# Patient Record
Sex: MALE | Race: OTHER | NOT HISPANIC OR LATINO
[De-identification: names, ages, dates, MRNs, and addresses within clinical notes are randomized per-mention and may not be internally consistent; named-entity substitution may affect disease eponyms.]

---

## 2022-10-26 PROBLEM — Z00.00 ENCOUNTER FOR PREVENTIVE HEALTH EXAMINATION: Status: ACTIVE | Noted: 2022-10-26

## 2022-11-07 ENCOUNTER — APPOINTMENT (OUTPATIENT)
Dept: CV DIAGNOSITCS | Facility: HOSPITAL | Age: 57
End: 2022-11-07

## 2022-11-07 ENCOUNTER — OUTPATIENT (OUTPATIENT)
Dept: OUTPATIENT SERVICES | Facility: HOSPITAL | Age: 57
LOS: 1 days | End: 2022-11-07
Payer: COMMERCIAL

## 2022-11-07 VITALS
DIASTOLIC BLOOD PRESSURE: 98 MMHG | WEIGHT: 151.9 LBS | SYSTOLIC BLOOD PRESSURE: 125 MMHG | RESPIRATION RATE: 18 BRPM | TEMPERATURE: 98 F | HEIGHT: 67 IN | HEART RATE: 91 BPM | OXYGEN SATURATION: 98 %

## 2022-11-07 VITALS
DIASTOLIC BLOOD PRESSURE: 90 MMHG | HEART RATE: 66 BPM | OXYGEN SATURATION: 100 % | RESPIRATION RATE: 16 BRPM | SYSTOLIC BLOOD PRESSURE: 132 MMHG

## 2022-11-07 DIAGNOSIS — I48.91 UNSPECIFIED ATRIAL FIBRILLATION: ICD-10-CM

## 2022-11-07 LAB
ANION GAP SERPL CALC-SCNC: 12 MMOL/L — SIGNIFICANT CHANGE UP (ref 5–17)
BUN SERPL-MCNC: 17 MG/DL — SIGNIFICANT CHANGE UP (ref 7–23)
CALCIUM SERPL-MCNC: 9.2 MG/DL — SIGNIFICANT CHANGE UP (ref 8.4–10.5)
CHLORIDE SERPL-SCNC: 103 MMOL/L — SIGNIFICANT CHANGE UP (ref 96–108)
CO2 SERPL-SCNC: 25 MMOL/L — SIGNIFICANT CHANGE UP (ref 22–31)
CREAT SERPL-MCNC: 0.84 MG/DL — SIGNIFICANT CHANGE UP (ref 0.5–1.3)
EGFR: 103 ML/MIN/1.73M2 — SIGNIFICANT CHANGE UP
GLUCOSE SERPL-MCNC: 111 MG/DL — HIGH (ref 70–99)
HCT VFR BLD CALC: 44.8 % — SIGNIFICANT CHANGE UP (ref 39–50)
HGB BLD-MCNC: 15.2 G/DL — SIGNIFICANT CHANGE UP (ref 13–17)
INR BLD: 1.26 RATIO — HIGH (ref 0.88–1.16)
MCHC RBC-ENTMCNC: 30.6 PG — SIGNIFICANT CHANGE UP (ref 27–34)
MCHC RBC-ENTMCNC: 33.9 GM/DL — SIGNIFICANT CHANGE UP (ref 32–36)
MCV RBC AUTO: 90.3 FL — SIGNIFICANT CHANGE UP (ref 80–100)
NRBC # BLD: 0 /100 WBCS — SIGNIFICANT CHANGE UP (ref 0–0)
PLATELET # BLD AUTO: 225 K/UL — SIGNIFICANT CHANGE UP (ref 150–400)
POTASSIUM SERPL-MCNC: 4.5 MMOL/L — SIGNIFICANT CHANGE UP (ref 3.5–5.3)
POTASSIUM SERPL-SCNC: 4.5 MMOL/L — SIGNIFICANT CHANGE UP (ref 3.5–5.3)
PROTHROM AB SERPL-ACNC: 14.6 SEC — HIGH (ref 10.5–13.4)
RBC # BLD: 4.96 M/UL — SIGNIFICANT CHANGE UP (ref 4.2–5.8)
RBC # FLD: 13.1 % — SIGNIFICANT CHANGE UP (ref 10.3–14.5)
SODIUM SERPL-SCNC: 140 MMOL/L — SIGNIFICANT CHANGE UP (ref 135–145)
WBC # BLD: 6.35 K/UL — SIGNIFICANT CHANGE UP (ref 3.8–10.5)
WBC # FLD AUTO: 6.35 K/UL — SIGNIFICANT CHANGE UP (ref 3.8–10.5)

## 2022-11-07 PROCEDURE — 93005 ELECTROCARDIOGRAM TRACING: CPT

## 2022-11-07 PROCEDURE — 85610 PROTHROMBIN TIME: CPT

## 2022-11-07 PROCEDURE — 93312 ECHO TRANSESOPHAGEAL: CPT | Mod: 26

## 2022-11-07 PROCEDURE — 76376 3D RENDER W/INTRP POSTPROCES: CPT | Mod: 26

## 2022-11-07 PROCEDURE — 76376 3D RENDER W/INTRP POSTPROCES: CPT

## 2022-11-07 PROCEDURE — 93312 ECHO TRANSESOPHAGEAL: CPT

## 2022-11-07 PROCEDURE — 93010 ELECTROCARDIOGRAM REPORT: CPT

## 2022-11-07 PROCEDURE — 85027 COMPLETE CBC AUTOMATED: CPT

## 2022-11-07 PROCEDURE — 93306 TTE W/DOPPLER COMPLETE: CPT

## 2022-11-07 PROCEDURE — 80048 BASIC METABOLIC PNL TOTAL CA: CPT

## 2022-11-07 PROCEDURE — 92960 CARDIOVERSION ELECTRIC EXT: CPT

## 2022-11-07 PROCEDURE — 93306 TTE W/DOPPLER COMPLETE: CPT | Mod: 26

## 2022-11-07 NOTE — ASU PATIENT PROFILE, ADULT - FALL HARM RISK - UNIVERSAL INTERVENTIONS
Bed in lowest position, wheels locked, appropriate side rails in place/Call bell, personal items and telephone in reach/Instruct patient to call for assistance before getting out of bed or chair/Non-slip footwear when patient is out of bed/[place] to call system/Physically safe environment - no spills, clutter or unnecessary equipment/Purposeful Proactive Rounding/Room/bathroom lighting operational, light cord in reach

## 2022-11-07 NOTE — PROGRESS NOTE ADULT - SUBJECTIVE AND OBJECTIVE BOX
EP Brief Operative Note    Diagnosis: persistent AF  Procedure: JO-DCCV  Surgeon: Hakeem Evans M.D.  Findings: none  EBL: minimal  Specimens: none  Post-op Diagnosis: persistent AF  Assistants: none      A/P) successful JO-DCCV, no acute complications    -d/c home  -no changes to home meds  -continue anticoagulation  -f/u with me at Dr Sosa's office on Tuesday 11/29 at 140pm      Hakeem Evans M.D., [facility]  Cardiac Electrophysiology  [facility]  [address] [place] [address]  [email or web address]    office [phone]  pager [phone]

## 2022-11-07 NOTE — PRE-ANESTHESIA EVALUATION ADULT - NSANTHBMIRD_ENT_A_CORE
Pt called in. Pt stated that he sent down a refill request for hydrocodone early last week, but still has not received anything. Per [employer], no refill request was received. Order placed 1/23/2020.    No

## 2023-01-03 ENCOUNTER — EMERGENCY (EMERGENCY)
Facility: HOSPITAL | Age: 58
LOS: 1 days | Discharge: HOME | End: 2023-01-03
Attending: EMERGENCY MEDICINE | Admitting: EMERGENCY MEDICINE
Payer: COMMERCIAL

## 2023-01-03 VITALS
OXYGEN SATURATION: 100 % | HEART RATE: 82 BPM | SYSTOLIC BLOOD PRESSURE: 133 MMHG | DIASTOLIC BLOOD PRESSURE: 96 MMHG | RESPIRATION RATE: 19 BRPM

## 2023-01-03 VITALS
DIASTOLIC BLOOD PRESSURE: 75 MMHG | OXYGEN SATURATION: 98 % | SYSTOLIC BLOOD PRESSURE: 143 MMHG | TEMPERATURE: 98 F | RESPIRATION RATE: 18 BRPM | HEART RATE: 105 BPM

## 2023-01-03 LAB
ALBUMIN SERPL ELPH-MCNC: 4.7 G/DL — SIGNIFICANT CHANGE UP (ref 3.3–5)
ALP SERPL-CCNC: 80 U/L — SIGNIFICANT CHANGE UP (ref 40–120)
ALT FLD-CCNC: 34 U/L — SIGNIFICANT CHANGE UP (ref 4–41)
ANION GAP SERPL CALC-SCNC: 13 MMOL/L — SIGNIFICANT CHANGE UP (ref 7–14)
APTT BLD: 35.8 SEC — SIGNIFICANT CHANGE UP (ref 27–36.3)
AST SERPL-CCNC: 49 U/L — HIGH (ref 4–40)
BASOPHILS # BLD AUTO: 0.02 K/UL — SIGNIFICANT CHANGE UP (ref 0–0.2)
BASOPHILS NFR BLD AUTO: 0.2 % — SIGNIFICANT CHANGE UP (ref 0–2)
BILIRUB SERPL-MCNC: 0.9 MG/DL — SIGNIFICANT CHANGE UP (ref 0.2–1.2)
BUN SERPL-MCNC: 14 MG/DL — SIGNIFICANT CHANGE UP (ref 7–23)
CALCIUM SERPL-MCNC: 9.4 MG/DL — SIGNIFICANT CHANGE UP (ref 8.4–10.5)
CHLORIDE SERPL-SCNC: 101 MMOL/L — SIGNIFICANT CHANGE UP (ref 98–107)
CO2 SERPL-SCNC: 23 MMOL/L — SIGNIFICANT CHANGE UP (ref 22–31)
CREAT SERPL-MCNC: 0.95 MG/DL — SIGNIFICANT CHANGE UP (ref 0.5–1.3)
EGFR: 95 ML/MIN/1.73M2 — SIGNIFICANT CHANGE UP
EOSINOPHIL # BLD AUTO: 0.02 K/UL — SIGNIFICANT CHANGE UP (ref 0–0.5)
EOSINOPHIL NFR BLD AUTO: 0.2 % — SIGNIFICANT CHANGE UP (ref 0–6)
FLUAV AG NPH QL: SIGNIFICANT CHANGE UP
FLUBV AG NPH QL: SIGNIFICANT CHANGE UP
GLUCOSE SERPL-MCNC: 96 MG/DL — SIGNIFICANT CHANGE UP (ref 70–99)
HCT VFR BLD CALC: 47.4 % — SIGNIFICANT CHANGE UP (ref 39–50)
HGB BLD-MCNC: 15.9 G/DL — SIGNIFICANT CHANGE UP (ref 13–17)
IANC: 6.76 K/UL — SIGNIFICANT CHANGE UP (ref 1.8–7.4)
IMM GRANULOCYTES NFR BLD AUTO: 0.3 % — SIGNIFICANT CHANGE UP (ref 0–0.9)
INR BLD: 1.2 RATIO — HIGH (ref 0.88–1.16)
LYMPHOCYTES # BLD AUTO: 1.45 K/UL — SIGNIFICANT CHANGE UP (ref 1–3.3)
LYMPHOCYTES # BLD AUTO: 16.1 % — SIGNIFICANT CHANGE UP (ref 13–44)
MAGNESIUM SERPL-MCNC: 1.9 MG/DL — SIGNIFICANT CHANGE UP (ref 1.6–2.6)
MCHC RBC-ENTMCNC: 29.9 PG — SIGNIFICANT CHANGE UP (ref 27–34)
MCHC RBC-ENTMCNC: 33.5 GM/DL — SIGNIFICANT CHANGE UP (ref 32–36)
MCV RBC AUTO: 89.1 FL — SIGNIFICANT CHANGE UP (ref 80–100)
MONOCYTES # BLD AUTO: 0.71 K/UL — SIGNIFICANT CHANGE UP (ref 0–0.9)
MONOCYTES NFR BLD AUTO: 7.9 % — SIGNIFICANT CHANGE UP (ref 2–14)
NEUTROPHILS # BLD AUTO: 6.76 K/UL — SIGNIFICANT CHANGE UP (ref 1.8–7.4)
NEUTROPHILS NFR BLD AUTO: 75.3 % — SIGNIFICANT CHANGE UP (ref 43–77)
NRBC # BLD: 0 /100 WBCS — SIGNIFICANT CHANGE UP (ref 0–0)
NRBC # FLD: 0 K/UL — SIGNIFICANT CHANGE UP (ref 0–0)
NT-PROBNP SERPL-SCNC: 62 PG/ML — SIGNIFICANT CHANGE UP
PHOSPHATE SERPL-MCNC: 3.8 MG/DL — SIGNIFICANT CHANGE UP (ref 2.5–4.5)
PLATELET # BLD AUTO: 241 K/UL — SIGNIFICANT CHANGE UP (ref 150–400)
POTASSIUM SERPL-MCNC: 5.3 MMOL/L — SIGNIFICANT CHANGE UP (ref 3.5–5.3)
POTASSIUM SERPL-SCNC: 5.3 MMOL/L — SIGNIFICANT CHANGE UP (ref 3.5–5.3)
PROT SERPL-MCNC: 8.3 G/DL — SIGNIFICANT CHANGE UP (ref 6–8.3)
PROTHROM AB SERPL-ACNC: 13.9 SEC — HIGH (ref 10.5–13.4)
RBC # BLD: 5.32 M/UL — SIGNIFICANT CHANGE UP (ref 4.2–5.8)
RBC # FLD: 13.5 % — SIGNIFICANT CHANGE UP (ref 10.3–14.5)
RSV RNA NPH QL NAA+NON-PROBE: SIGNIFICANT CHANGE UP
SARS-COV-2 RNA SPEC QL NAA+PROBE: SIGNIFICANT CHANGE UP
SODIUM SERPL-SCNC: 137 MMOL/L — SIGNIFICANT CHANGE UP (ref 135–145)
TROPONIN T, HIGH SENSITIVITY RESULT: <6 NG/L — SIGNIFICANT CHANGE UP
TSH SERPL-MCNC: 1.67 UIU/ML — SIGNIFICANT CHANGE UP (ref 0.27–4.2)
WBC # BLD: 8.99 K/UL — SIGNIFICANT CHANGE UP (ref 3.8–10.5)
WBC # FLD AUTO: 8.99 K/UL — SIGNIFICANT CHANGE UP (ref 3.8–10.5)

## 2023-01-03 PROCEDURE — 71045 X-RAY EXAM CHEST 1 VIEW: CPT | Mod: 26

## 2023-01-03 PROCEDURE — 99285 EMERGENCY DEPT VISIT HI MDM: CPT

## 2023-01-03 RX ORDER — METOPROLOL TARTRATE 50 MG
25 TABLET ORAL ONCE
Refills: 0 | Status: COMPLETED | OUTPATIENT
Start: 2023-01-03 | End: 2023-01-03

## 2023-01-03 RX ORDER — DILTIAZEM HCL 120 MG
60 CAPSULE, EXT RELEASE 24 HR ORAL ONCE
Refills: 0 | Status: COMPLETED | OUTPATIENT
Start: 2023-01-03 | End: 2023-01-03

## 2023-01-03 RX ORDER — METOPROLOL TARTRATE 50 MG
1 TABLET ORAL
Qty: 60 | Refills: 0
Start: 2023-01-03 | End: 2023-02-01

## 2023-01-03 RX ORDER — DILTIAZEM HCL 120 MG
20 CAPSULE, EXT RELEASE 24 HR ORAL ONCE
Refills: 0 | Status: COMPLETED | OUTPATIENT
Start: 2023-01-03 | End: 2023-01-03

## 2023-01-03 RX ADMIN — Medication 60 MILLIGRAM(S): at 16:47

## 2023-01-03 RX ADMIN — Medication 20 MILLIGRAM(S): at 14:11

## 2023-01-03 RX ADMIN — Medication 25 MILLIGRAM(S): at 17:51

## 2023-01-03 NOTE — ED PROVIDER NOTE - PROGRESS NOTE DETAILS
Simon PGY-3: Spoke with Dr. Evans (performed cardioversion 2 mo ago for pt), his team will see patient, likely admit for ablation. Patient was given 20mg IV cardizem with good rate control to 80s. Remy Avilez MD, PGY1  Spoke with cards. Pt to be dc home with metoprolol 25 bid and cards follow up. Discussed with patient. In agreement with plan. Answered all questions. Good for dc.

## 2023-01-03 NOTE — ED PROVIDER NOTE - CLINICAL SUMMARY MEDICAL DECISION MAKING FREE TEXT BOX
57 M with known afib diagnosed 1 month prior, cardioverted and started on eliquis, no rate control, presented to ED after experiencing palpitations and found to be in afib preprocedure for colonscopy. Eliquis was held for past 4 days. Tachy to 120s on monitor, currently in afib, vitals otherwise stable. Afebrile no signs of infectious etiology. Exam just showing irregular heart rhythm. Patient otherwise feeling well. No CP or SOB. Will get labs, EKG, xray, give diltiazem 20 and discuss with cardiology if they want another cardioversion. Reassess. 57 M with known afib diagnosed 1 month prior, cardioverted and started on eliquis, no rate control, presented to ED after experiencing palpitations and found to be in afib preprocedure for colonscopy. Eliquis was held for past 4 days. Tachy to 120s on monitor, currently in afib, vitals otherwise stable. Afebrile no signs of infectious etiology. Exam just showing irregular heart rhythm. Patient otherwise feeling well. No CP or SOB. Will get labs, EKG, xray, give diltiazem 20 and discuss with cardiology if they want another cardioversion. Reassess.    Olamide Carballo MD attending physician patient is a 57-year-old man with known A. fib that a month ago they cardioverted and started him on Eliquis.  He is not on any rate control agents.  He was sent to the emergency department after being found to be in rapid A. fib prior to colonoscopy.  Patient feels well.  He looks well is alert and appropriate heart rate here is 105.  We will discuss with his cardiologist but in the meantime can give a rate control agent and likely restart his Eliquis

## 2023-01-03 NOTE — ED PROVIDER NOTE - PHYSICAL EXAMINATION
Gen: NAD  HEENT: EOMI, no nasal discharge, mucous membranes moist  CV: tachycardic irregular rhythm, +S1/S2 2+ radial pulses b/l  Resp: CTAB, no W/R/R, no accessory muscle use, no increased work of breathing  GI: Abdomen soft non-distended, NTTP  MSK: No open wounds, no bruising, no LE edema  Neuro: A&Ox4, following commands, moving all four extremities spontaneously  Psych: appropriate mood

## 2023-01-03 NOTE — ED PROVIDER NOTE - NS ED ROS FT
Gen: Denies fevers  CV: +palpitations  Skin: denies color changes  Resp: Denies SOB, cough  Endo: Denies increased urination  GI: Denies nausea, vomiting  Msk: Denies extremity pain  : Denies dysuria  Neuro: Denies LOC  Psych: Denies mood changes  all other ROS negative unless indicated in HPI

## 2023-01-03 NOTE — ED PROVIDER NOTE - PATIENT PORTAL LINK FT
You can access the FollowMyHealth Patient Portal offered by [facility] by registering at the following website: [email or web address]. By joining [employer]’s FollowMyHealth portal, you will also be able to view your health information using other applications (apps) compatible with our system. You can access the FollowMyHealth Patient Portal offered by [facility] by registering at the following website: [email or web address]. By joining [employer]’s FollowMyHealth portal, you will also be able to view your health information using other applications (apps) compatible with our system. You can access the FollowMyHealth Patient Portal offered by [facility] by registering at the following website: [email or web address]. By joining [employer]’s FollowMyHealth portal, you will also be able to view your health information using other applications (apps) compatible with our system.

## 2023-01-03 NOTE — CONSULT NOTE ADULT - SUBJECTIVE AND OBJECTIVE BOX
EP Attending    HISTORY OF PRESENT ILLNESS:  Mr Leal is a very pleasant 63yo man who presents from GI office, doing preparatory work for his first colonoscopy.  He was referred for having AFib on his EKG.  This was treated in 11/2022 by Dr Evans, who cardioverted him at [facility].  In the interim, he has been on oral anticoagulation for the last 4-6 weeks, only recently holding it ahead of his colonoscopy.  He was not aware that he was back in AFib, as he was not having any palpitations, lightheadedness, chest pain or shortness of breath.  May have felt a little more tired than usual.  He has not been having any GI bleeding on eliquis, but had put off his colonoscopy "for too long" and wants to get this done as scheduled.  After receiving IV AVN blockers in the ED, his heartrate in AFib is <100bpm.  He feels well and is ready to go home.  No angina, no orthopnea or LE edema, good appetite.  A 10 pt ROS is otherwise negative.      PAST MEDICAL & SURGICAL HISTORY:  Afib  HLD (hyperlipidemia)  No significant past surgical history      MEDICATIONS  (STANDING):  metoprolol tartrate 25 milliGRAM(s) Oral Once    Allergies    No Known Allergies    Intolerances    FAMILY HISTORY:    Non-contributary for premature coronary disease or sudden cardiac death    SOCIAL HISTORY:    [x ] Non-smoker  [ ] Smoker  [ ] Alcohol    PHYSICAL EXAM:  T(C): 36.7 (01-03-23 @ 11:57), Max: 36.7 (01-03-23 @ 11:57)  HR: 82 (01-03-23 @ 15:46) (79 - 105)  BP: 133/96 (01-03-23 @ 15:46) (115/87 - 143/75)  RR: 19 (01-03-23 @ 15:46) (16 - 19)  SpO2: 100% (01-03-23 @ 15:46) (98% - 100%)  Wt(kg): --    General: Well nourished, no acute distress, alert and oriented x 3  Head: normocephalic, no trauma  Neck: no JVD, no bruit, supple, not enlarged  CV: irregular S1S2, no S3, regular rate, rhythm is AFib, no murmurs.    Lungs: clear BL, no rales or wheezes  Abdomen: bowel sounds +, soft, nontender, nondistended  Extremities: no clubbing, cyanosis or edema  Neuro: Moves all 4 extremities, sensation intact x 4 extremities  Skin: warm and moist, normal turgor  Psych: Mood and affect are appropriate for circumstances  MSK: normal range of motion and strength x4 extremities.      TELEMETRY: AF 80-120bpm	    ECG: AF/RVR 120bpm  	  LABS:	 	                          15.9   8.99  )-----------( 241      ( 03 Jan 2023 14:04 )             47.4     01-03    137  |  101  |  14  ----------------------------<  96  5.3   |  23  |  0.95    Ca    9.4      03 Jan 2023 14:04  Phos  3.8     01-03  Mg     1.90     01-03    TPro  8.3  /  Alb  4.7  /  TBili  0.9  /  DBili  x   /  AST  49<H>  /  ALT  34  /  AlkPhos  80  01-03    proBNP: Serum Pro-Brain Natriuretic Peptide: 62 pg/mL (01-03 @ 14:04)    TSH: Thyroid Stimulating Hormone, Serum: 1.67 uIU/mL (01-03 @ 14:04)      ASSESSMENT/PLAN: 	Mr Leal is a very pleasant 57y Male who presents to ED with recurrence of atrial fibrillation.    Persistent atrial fibrillation - low-grade symptoms of fatigue. No angina, palpitations, orthopnea, leg edema, lightheadedness or shortness of breath.    Heartrate better controlled after diltiazem.  Recommending oral metoprolol tartrate 25mg BID to start tonight.  Hold Apixaban pending colonoscopy, as per prior plan.  His [id number] score is ZERO, so his risk of stroke during this <1 week period is very very low.  As he has early recurrence after the first cardioversion, recommend he see Dr Evans again (scheduled for end of January) to discuss catheter ablation for early persistent AFib.  From my perspective, OK for discharge from the emergency room, with AF heartrate under 100, well controlled symptoms, and established followup plan.    Jonathan Nicolas M.D.  Cardiac Electrophysiology    office [phone]  pager [phone] EP Attending    HISTORY OF PRESENT ILLNESS:  Mr Leal is a very pleasant 61yo man who presents from GI office, doing preparatory work for his first colonoscopy.  He was referred for having AFib on his EKG.  This was treated in 11/2022 by Dr Evans, who cardioverted him at [facility].  In the interim, he has been on oral anticoagulation for the last 4-6 weeks, only recently holding it ahead of his colonoscopy.  He was not aware that he was back in AFib, as he was not having any palpitations, lightheadedness, chest pain or shortness of breath.  May have felt a little more tired than usual.  He has not been having any GI bleeding on eliquis, but had put off his colonoscopy "for too long" and wants to get this done as scheduled.  After receiving IV AVN blockers in the ED, his heartrate in AFib is <100bpm.  He feels well and is ready to go home.  No angina, no orthopnea or LE edema, good appetite.  A 10 pt ROS is otherwise negative.      PAST MEDICAL & SURGICAL HISTORY:  Afib  HLD (hyperlipidemia)  No significant past surgical history      MEDICATIONS  (STANDING):  metoprolol tartrate 25 milliGRAM(s) Oral Once    Allergies    No Known Allergies    Intolerances    FAMILY HISTORY:    Non-contributary for premature coronary disease or sudden cardiac death    SOCIAL HISTORY:    [x ] Non-smoker  [ ] Smoker  [ ] Alcohol    PHYSICAL EXAM:  T(C): 36.7 (01-03-23 @ 11:57), Max: 36.7 (01-03-23 @ 11:57)  HR: 82 (01-03-23 @ 15:46) (79 - 105)  BP: 133/96 (01-03-23 @ 15:46) (115/87 - 143/75)  RR: 19 (01-03-23 @ 15:46) (16 - 19)  SpO2: 100% (01-03-23 @ 15:46) (98% - 100%)  Wt(kg): --    General: Well nourished, no acute distress, alert and oriented x 3  Head: normocephalic, no trauma  Neck: no JVD, no bruit, supple, not enlarged  CV: irregular S1S2, no S3, regular rate, rhythm is AFib, no murmurs.    Lungs: clear BL, no rales or wheezes  Abdomen: bowel sounds +, soft, nontender, nondistended  Extremities: no clubbing, cyanosis or edema  Neuro: Moves all 4 extremities, sensation intact x 4 extremities  Skin: warm and moist, normal turgor  Psych: Mood and affect are appropriate for circumstances  MSK: normal range of motion and strength x4 extremities.      TELEMETRY: AF 80-120bpm	    ECG: AF/RVR 120bpm  	  LABS:	 	                          15.9   8.99  )-----------( 241      ( 03 Jan 2023 14:04 )             47.4     01-03    137  |  101  |  14  ----------------------------<  96  5.3   |  23  |  0.95    Ca    9.4      03 Jan 2023 14:04  Phos  3.8     01-03  Mg     1.90     01-03    TPro  8.3  /  Alb  4.7  /  TBili  0.9  /  DBili  x   /  AST  49<H>  /  ALT  34  /  AlkPhos  80  01-03    proBNP: Serum Pro-Brain Natriuretic Peptide: 62 pg/mL (01-03 @ 14:04)    TSH: Thyroid Stimulating Hormone, Serum: 1.67 uIU/mL (01-03 @ 14:04)      ASSESSMENT/PLAN: 	Mr Leal is a very pleasant 57y Male who presents to ED with recurrence of atrial fibrillation.    Persistent atrial fibrillation - low-grade symptoms of fatigue. No angina, palpitations, orthopnea, leg edema, lightheadedness or shortness of breath.    Heartrate better controlled after diltiazem.  Recommending oral metoprolol tartrate 25mg BID to start tonight.  Hold Apixaban pending colonoscopy, as per prior plan.  His [id number] score is ZERO, so his risk of stroke during this <1 week period is very very low.  As he has early recurrence after the first cardioversion, recommend he see Dr Evans again (scheduled for end of January) to discuss catheter ablation for early persistent AFib.  From my perspective, OK for discharge from the emergency room, with AF heartrate under 100, well controlled symptoms, and established followup plan.    Jonathan Nicolas M.D.  Cardiac Electrophysiology    office [phone]  pager [phone] EP Attending    HISTORY OF PRESENT ILLNESS:  Mr Leal is a very pleasant 61yo man who presents from GI office, doing preparatory work for his first colonoscopy.  He was referred for having AFib on his EKG.  This was treated in 11/2022 by Dr Evans, who cardioverted him at [facility].  In the interim, he has been on oral anticoagulation for the last 4-6 weeks, only recently holding it ahead of his colonoscopy.  He was not aware that he was back in AFib, as he was not having any palpitations, lightheadedness, chest pain or shortness of breath.  May have felt a little more tired than usual.  He has not been having any GI bleeding on eliquis, but had put off his colonoscopy "for too long" and wants to get this done as scheduled.  After receiving IV AVN blockers in the ED, his heartrate in AFib is <100bpm.  He feels well and is ready to go home.  No angina, no orthopnea or LE edema, good appetite.  A 10 pt ROS is otherwise negative.      PAST MEDICAL & SURGICAL HISTORY:  Afib  HLD (hyperlipidemia)  No significant past surgical history      MEDICATIONS  (STANDING):  metoprolol tartrate 25 milliGRAM(s) Oral Once    Allergies    No Known Allergies    Intolerances    FAMILY HISTORY:    Non-contributary for premature coronary disease or sudden cardiac death    SOCIAL HISTORY:    [x ] Non-smoker  [ ] Smoker  [ ] Alcohol    PHYSICAL EXAM:  T(C): 36.7 (01-03-23 @ 11:57), Max: 36.7 (01-03-23 @ 11:57)  HR: 82 (01-03-23 @ 15:46) (79 - 105)  BP: 133/96 (01-03-23 @ 15:46) (115/87 - 143/75)  RR: 19 (01-03-23 @ 15:46) (16 - 19)  SpO2: 100% (01-03-23 @ 15:46) (98% - 100%)  Wt(kg): --    General: Well nourished, no acute distress, alert and oriented x 3  Head: normocephalic, no trauma  Neck: no JVD, no bruit, supple, not enlarged  CV: irregular S1S2, no S3, regular rate, rhythm is AFib, no murmurs.    Lungs: clear BL, no rales or wheezes  Abdomen: bowel sounds +, soft, nontender, nondistended  Extremities: no clubbing, cyanosis or edema  Neuro: Moves all 4 extremities, sensation intact x 4 extremities  Skin: warm and moist, normal turgor  Psych: Mood and affect are appropriate for circumstances  MSK: normal range of motion and strength x4 extremities.      TELEMETRY: AF 80-120bpm	    ECG: AF/RVR 120bpm  	  LABS:	 	                          15.9   8.99  )-----------( 241      ( 03 Jan 2023 14:04 )             47.4     01-03    137  |  101  |  14  ----------------------------<  96  5.3   |  23  |  0.95    Ca    9.4      03 Jan 2023 14:04  Phos  3.8     01-03  Mg     1.90     01-03    TPro  8.3  /  Alb  4.7  /  TBili  0.9  /  DBili  x   /  AST  49<H>  /  ALT  34  /  AlkPhos  80  01-03    proBNP: Serum Pro-Brain Natriuretic Peptide: 62 pg/mL (01-03 @ 14:04)    TSH: Thyroid Stimulating Hormone, Serum: 1.67 uIU/mL (01-03 @ 14:04)      ASSESSMENT/PLAN: 	Mr Leal is a very pleasant 57y Male who presents to ED with recurrence of atrial fibrillation.    Persistent atrial fibrillation - low-grade symptoms of fatigue. No angina, palpitations, orthopnea, leg edema, lightheadedness or shortness of breath.    Heartrate better controlled after diltiazem.  Recommending oral metoprolol tartrate 25mg BID to start tonight.  Hold Apixaban pending colonoscopy, as per prior plan.  His [id number] score is ZERO, so his risk of stroke during this <1 week period is very very low.  As he has early recurrence after the first cardioversion, recommend he see Dr Evans again (scheduled for end of January) to discuss catheter ablation for early persistent AFib.  From my perspective, OK for discharge from the emergency room, with AF heartrate under 100, well controlled symptoms, and established followup plan.    Jonathan Nicolas M.D.  Cardiac Electrophysiology    office [phone]  pager [phone]

## 2023-01-03 NOTE — ED ADULT NURSE NOTE - OBJECTIVE STATEMENT
Patient has a h/o a fib, found in rapid a fib at colonoscopy appointment today. Patient states feeling palpitations, no chest pain , no sob. IV placed, labs sent, medicated as ordered.

## 2023-01-03 NOTE — ED ADULT NURSE NOTE - NSIMPLEMENTINTERV_GEN_ALL_ED
Implemented All Universal Safety Interventions:  [place] to call system. Call bell, personal items and telephone within reach. Instruct patient to call for assistance. Room bathroom lighting operational. Non-slip footwear when patient is off stretcher. Physically safe environment: no spills, clutter or unnecessary equipment. Stretcher in lowest position, wheels locked, appropriate side rails in place. Implemented All Universal Safety Interventions:  [place] to call system. Call bell, personal items and telephone within reach. Instruct patient to call for assistance. Room bathroom lighting operational. Non-slip footwear when patient is off stretcher. Physically safe environment: no spills, clutter or unnecessary equipment. Stretcher in lowest position, wheels locked, appropriate side rails in place. Implemented All Universal Safety Interventions:  [place] to call system. Call bell, personal items and telephone within reach. Instruct patient to call for assistance. Room bathroom lighting operational. Non-slip footwear when patient is off stretcher. Physically safe environment: no spills, clutter or unnecessary equipment. Stretcher in lowest position, wheels locked, appropriate side rails in place.

## 2023-01-03 NOTE — ED PROVIDER NOTE - ATTENDING CONTRIBUTION TO CARE
Olamide Carballo MD attending physician patient is a 57-year-old man with known A. fib that a month ago they cardioverted and started him on Eliquis.  He is not on any rate control agents.  He was sent to the emergency department after being found to be in rapid A. fib prior to colonoscopy.  Patient feels well.  He looks well is alert and appropriate heart rate here is 105.  We will discuss with his cardiologist but in the meantime can give a rate control agent and likely restart his Eliquis    I performed a history and physical exam of the patient and discussed their management with the resident and /or advanced care provider. I reviewed the resident and /or ACP's note and agree with the documented findings and plan of care. My medical decison making and observations are found above.

## 2023-01-03 NOTE — CONSULT NOTE ADULT - SUBJECTIVE AND OBJECTIVE BOX
Cardiovascular Disease Initial Evaluation  Date of service: 01-03-23 @ 13:44    CHIEF COMPLAINT: afib    HISTORY OF PRESENT ILLNESS:  57 M with history of Afib s/p DCCV on Eliquis, HLD and HTN who presents from his GI specialist with Afib. Pt was scheduled to undergo Colonoscopy today. Upon arrival to the procedure pt was noted to be tachycardic. EKG done at office (Not on file) was reported to be Afib RVR. Pt sent to ED for further evaluation. Pt is currently in the waiting room with his son in no distress. He states that he had been recently diagnosed with Afib about a month ago and was "shocked". He was then placed on Eliquis for anticoagulation. Pt was unable to recall the name of the cardiologist he saw in the past, but on the prescription bottles had the name of Nimesh Sosa. Pt currently denies chest pain or SOB.       Allergies      Intolerances    	    MEDICATIONS:                  PAST MEDICAL & SURGICAL HISTORY:      FAMILY HISTORY:      SOCIAL HISTORY:    The patient is a nonsmoker       REVIEW OF SYSTEMS:  See HPI, otherwise complete 14 point review of systems negative    [x ] All others negative	  [ ] Unable to obtain    PHYSICAL EXAM:  T(C): 36.7 (01-03-23 @ 11:57), Max: 36.7 (01-03-23 @ 11:57)  HR: 105 (01-03-23 @ 11:57) (105 - 105)  BP: 143/75 (01-03-23 @ 11:57) (143/75 - 143/75)  RR: 18 (01-03-23 @ 11:57) (18 - 18)  SpO2: 98% (01-03-23 @ 11:57) (98% - 98%)  Wt(kg): --  I&O's Summary      Appearance: No Acute Distress; resting comfortably  HEENT:  Normal oral mucosa, PERRL, EOMI	  Cardiovascular Irregular, No JVD, No murmurs/rubs/gallops  Respiratory: Normal respiratory effort; Lungs clear to auscultation bilaterally  Gastrointestinal:  Soft, Non-tender, + BS	  Skin: No rashes, No ecchymoses, No cyanosis	  Neurologic: Non-focal; no weakness  Extremities: No clubbing, cyanosis or edema  Vascular: Peripheral pulses palpable 2+ bilaterally  Psychiatry: A & O x 3, Mood & affect appropriate    Laboratory Data:	 	              proBNP:   Lipid Profile:   HgA1c:   TSH:       CARDIAC MARKERS:            Interpretation of Telemetry: N/a	    ECG:  N/a	  RADIOLOGY:  OTHER: 	    PREVIOUS DIAGNOSTIC TESTING:    [ ] Echocardiogram:  [ ] Catheterization:  [ ] Stress Test:  	    Assessment:  57 M with history of Afib s/p DCCV on Eliquis, HLD and HTN who presents from his GI specialist with Afib.    Plan of Care:    #Afib RVR  - Reported in office.   - Please obtain EKG  - Pt has not been on his Eliquis for the past several days in anticipation of Colonoscopy   - At this time would start Heparin Gtt if patient is to be admitted, with anticipation of colonoscopy to be done as inpatient  - Recommend Metoprolol 25mg BID with holding parameters for rate control  - Obtain TTE    #HLD  - Statin therapy    #HTN  - Continue home medication regimen      #ACP (advance care planning)-  Advanced care planning was discussed with the patient.  Risks, benefits and alternatives of medical treatment and procedures were discussed in detail and all questions were answered. 30 additional minutes spent addressing advance care plans.        72 minutes spent on total encounter; more than 50% of the visit was spent counseling and/or coordinating care by the attending physician.   	  Bruce Cameron DO [facility]  Cardiovascular Diseases  [phone]     Cardiovascular Disease Initial Evaluation  Date of service: 01-03-23 @ 13:44    CHIEF COMPLAINT: afib    HISTORY OF PRESENT ILLNESS:  57 M with history of Afib s/p DCCV on Eliquis, HLD and HTN who presents from his GI specialist with Afib. Pt was scheduled to undergo Colonoscopy today. Upon arrival to the procedure pt was noted to be tachycardic. EKG done at office (Not on file) was reported to be Afib RVR. Pt sent to ED for further evaluation. Pt is currently in the waiting room with his son in no distress. He states that he had been recently diagnosed with Afib about a month ago and was "shocked". He was then placed on Eliquis for anticoagulation. Pt was unable to recall the name of the cardiologist he saw in the past, but on the prescription bottles had the name of Nimesh Sosa. Pt currently denies chest pain or SOB.       Allergies      Intolerances    	    MEDICATIONS:                  PAST MEDICAL & SURGICAL HISTORY:      FAMILY HISTORY:      SOCIAL HISTORY:    The patient is a nonsmoker       REVIEW OF SYSTEMS:  See HPI, otherwise complete 14 point review of systems negative    [x ] All others negative	  [ ] Unable to obtain    PHYSICAL EXAM:  T(C): 36.7 (01-03-23 @ 11:57), Max: 36.7 (01-03-23 @ 11:57)  HR: 105 (01-03-23 @ 11:57) (105 - 105)  BP: 143/75 (01-03-23 @ 11:57) (143/75 - 143/75)  RR: 18 (01-03-23 @ 11:57) (18 - 18)  SpO2: 98% (01-03-23 @ 11:57) (98% - 98%)  Wt(kg): --  I&O's Summary      Appearance: No Acute Distress; resting comfortably  HEENT:  Normal oral mucosa, PERRL, EOMI	  Cardiovascular Irregular, No JVD, No murmurs/rubs/gallops  Respiratory: Normal respiratory effort; Lungs clear to auscultation bilaterally  Gastrointestinal:  Soft, Non-tender, + BS	  Skin: No rashes, No ecchymoses, No cyanosis	  Neurologic: Non-focal; no weakness  Extremities: No clubbing, cyanosis or edema  Vascular: Peripheral pulses palpable 2+ bilaterally  Psychiatry: A & O x 3, Mood & affect appropriate    Laboratory Data:	 	              proBNP:   Lipid Profile:   HgA1c:   TSH:       CARDIAC MARKERS:            Interpretation of Telemetry: N/a	    ECG:  N/a	  RADIOLOGY:  OTHER: 	    PREVIOUS DIAGNOSTIC TESTING:    [ ] Echocardiogram:  [ ] Catheterization:  [ ] Stress Test:  	    Assessment:  57 M with history of Afib s/p DCCV on Eliquis, HLD and HTN who presents from his GI specialist with Afib.    Plan of Care:    #Afib RVR  - Reported in office.   - Please obtain EKG  - Pt has not been on his Eliquis for the past several days in anticipation of Colonoscopy   - At this time would start Heparin Gtt if patient is to be admitted, with anticipation of colonoscopy to be done as inpatient  - Recommend Metoprolol 25mg BID with holding parameters for rate control  - Obtain TTE    #HLD  - Statin therapy    #HTN  - Continue home medication regimen      #ACP (advance care planning)-  Advanced care planning was discussed with the patient.  Risks, benefits and alternatives of medical treatment and procedures were discussed in detail and all questions were answered. 30 additional minutes spent addressing advance care plans.        72 minutes spent on total encounter; more than 50% of the visit was spent counseling and/or coordinating care by the attending physician.   	  Bruce Cameron DO [facility]  Cardiovascular Diseases  [phone]     Cardiovascular Disease Initial Evaluation  Date of service: 01-03-23 @ 13:44    CHIEF COMPLAINT: afib    HISTORY OF PRESENT ILLNESS:  57 M with history of Afib s/p DCCV on Eliquis, HLD and HTN who presents from his GI specialist with Afib. Pt was scheduled to undergo Colonoscopy today. Upon arrival to the procedure pt was noted to be tachycardic. EKG done at office (Not on file) was reported to be Afib RVR. Pt sent to ED for further evaluation. Pt is currently in the waiting room with his son in no distress. He states that he had been recently diagnosed with Afib about a month ago and was "shocked". He was then placed on Eliquis for anticoagulation. Pt was unable to recall the name of the cardiologist he saw in the past, but on the prescription bottles had the name of Nimesh Sosa. Pt currently denies chest pain or SOB.       Allergies      Intolerances    	    MEDICATIONS:                  PAST MEDICAL & SURGICAL HISTORY:      FAMILY HISTORY:      SOCIAL HISTORY:    The patient is a nonsmoker       REVIEW OF SYSTEMS:  See HPI, otherwise complete 14 point review of systems negative    [x ] All others negative	  [ ] Unable to obtain    PHYSICAL EXAM:  T(C): 36.7 (01-03-23 @ 11:57), Max: 36.7 (01-03-23 @ 11:57)  HR: 105 (01-03-23 @ 11:57) (105 - 105)  BP: 143/75 (01-03-23 @ 11:57) (143/75 - 143/75)  RR: 18 (01-03-23 @ 11:57) (18 - 18)  SpO2: 98% (01-03-23 @ 11:57) (98% - 98%)  Wt(kg): --  I&O's Summary      Appearance: No Acute Distress; resting comfortably  HEENT:  Normal oral mucosa, PERRL, EOMI	  Cardiovascular Irregular, No JVD, No murmurs/rubs/gallops  Respiratory: Normal respiratory effort; Lungs clear to auscultation bilaterally  Gastrointestinal:  Soft, Non-tender, + BS	  Skin: No rashes, No ecchymoses, No cyanosis	  Neurologic: Non-focal; no weakness  Extremities: No clubbing, cyanosis or edema  Vascular: Peripheral pulses palpable 2+ bilaterally  Psychiatry: A & O x 3, Mood & affect appropriate    Laboratory Data:	 	              proBNP:   Lipid Profile:   HgA1c:   TSH:       CARDIAC MARKERS:            Interpretation of Telemetry: N/a	    ECG:  N/a	  RADIOLOGY:  OTHER: 	    PREVIOUS DIAGNOSTIC TESTING:    [ ] Echocardiogram:  [ ] Catheterization:  [ ] Stress Test:  	    Assessment:  57 M with history of Afib s/p DCCV on Eliquis, HLD and HTN who presents from his GI specialist with Afib.    Plan of Care:    #Afib RVR  - Reported in office.   - Please obtain EKG  - Pt has not been on his Eliquis for the past several days in anticipation of Colonoscopy   - At this time would start Heparin Gtt if patient is to be admitted, with anticipation of colonoscopy to be done as inpatient  - Recommend Metoprolol 25mg BID with holding parameters for rate control  - Obtain TTE    #HLD  - Statin therapy    #HTN  - Continue home medication regimen      #ACP (advance care planning)-  Advanced care planning was discussed with the patient.  Risks, benefits and alternatives of medical treatment and procedures were discussed in detail and all questions were answered. 30 additional minutes spent addressing advance care plans.        72 minutes spent on total encounter; more than 50% of the visit was spent counseling and/or coordinating care by the attending physician.   	  Bruce Cameron DO [facility]  Cardiovascular Diseases  [phone]

## 2023-01-03 NOTE — ED PROVIDER NOTE - OBJECTIVE STATEMENT
57 M pmh HLD diagnosed with afib 1 month ago during a routine PCP visit. Followed up with cardiology who started him on eliquis and did sync cardioversion with resolution of afib. Pt was asymptomatic at that time. Pt was going for colonoscopy today and felt like his heart was racing pre procedure. EKG showed afib and was told to come to ED. Pts eliquis was held 4 days ago for colonoscopy. Pt currently feeling heart palpitations. Denies fever, cough, chest pain, SOB, abdominal pain.

## 2023-01-03 NOTE — ED ADULT TRIAGE NOTE - CHIEF COMPLAINT QUOTE
p/t scheduled for colonoscopy this am, c/o of palpitations since this am, procedure cancelled due to rapid a fib, p/t denies any chest pain denies sob

## 2023-04-27 NOTE — PRE-ANESTHESIA EVALUATION ADULT - HEART RATE (BEATS/MIN)
91 Birth Control Pills Counseling: Birth Control Pill Counseling: I discussed with the patient the potential side effects of OCPs including but not limited to increased risk of stroke, heart attack, thrombophlebitis, deep venous thrombosis, hepatic adenomas, breast changes, GI upset, headaches, and depression.  The patient verbalized understanding of the proper use and possible adverse effects of OCPs. All of the patient's questions and concerns were addressed.

## 2023-05-01 PROBLEM — I48.19 OTHER PERSISTENT ATRIAL FIBRILLATION: Chronic | Status: ACTIVE | Noted: 2022-11-07

## 2023-05-15 ENCOUNTER — OUTPATIENT (OUTPATIENT)
Dept: INPATIENT UNIT | Facility: HOSPITAL | Age: 58
LOS: 1 days | End: 2023-05-15
Payer: COMMERCIAL

## 2023-05-15 ENCOUNTER — TRANSCRIPTION ENCOUNTER (OUTPATIENT)
Age: 58
End: 2023-05-15

## 2023-05-15 VITALS
SYSTOLIC BLOOD PRESSURE: 122 MMHG | DIASTOLIC BLOOD PRESSURE: 71 MMHG | WEIGHT: 169.98 LBS | RESPIRATION RATE: 16 BRPM | OXYGEN SATURATION: 99 % | HEIGHT: 67 IN | HEART RATE: 80 BPM | TEMPERATURE: 98 F

## 2023-05-15 VITALS
DIASTOLIC BLOOD PRESSURE: 73 MMHG | SYSTOLIC BLOOD PRESSURE: 112 MMHG | OXYGEN SATURATION: 98 % | RESPIRATION RATE: 18 BRPM | TEMPERATURE: 98 F | HEART RATE: 98 BPM

## 2023-05-15 DIAGNOSIS — I48.91 UNSPECIFIED ATRIAL FIBRILLATION: ICD-10-CM

## 2023-05-15 LAB
ANION GAP SERPL CALC-SCNC: 13 MMOL/L — SIGNIFICANT CHANGE UP (ref 5–17)
BLD GP AB SCN SERPL QL: NEGATIVE — SIGNIFICANT CHANGE UP
BUN SERPL-MCNC: 15 MG/DL — SIGNIFICANT CHANGE UP (ref 7–23)
CALCIUM SERPL-MCNC: 9.4 MG/DL — SIGNIFICANT CHANGE UP (ref 8.4–10.5)
CHLORIDE SERPL-SCNC: 101 MMOL/L — SIGNIFICANT CHANGE UP (ref 96–108)
CO2 SERPL-SCNC: 25 MMOL/L — SIGNIFICANT CHANGE UP (ref 22–31)
CREAT SERPL-MCNC: 1.07 MG/DL — SIGNIFICANT CHANGE UP (ref 0.5–1.3)
EGFR: 82 ML/MIN/1.73M2 — SIGNIFICANT CHANGE UP
GLUCOSE SERPL-MCNC: 106 MG/DL — HIGH (ref 70–99)
HCT VFR BLD CALC: 48.7 % — SIGNIFICANT CHANGE UP (ref 39–50)
HGB BLD-MCNC: 15.8 G/DL — SIGNIFICANT CHANGE UP (ref 13–17)
MCHC RBC-ENTMCNC: 29.2 PG — SIGNIFICANT CHANGE UP (ref 27–34)
MCHC RBC-ENTMCNC: 32.4 GM/DL — SIGNIFICANT CHANGE UP (ref 32–36)
MCV RBC AUTO: 90 FL — SIGNIFICANT CHANGE UP (ref 80–100)
NRBC # BLD: 0 /100 WBCS — SIGNIFICANT CHANGE UP (ref 0–0)
PLATELET # BLD AUTO: 269 K/UL — SIGNIFICANT CHANGE UP (ref 150–400)
POTASSIUM SERPL-MCNC: 4.6 MMOL/L — SIGNIFICANT CHANGE UP (ref 3.5–5.3)
POTASSIUM SERPL-SCNC: 4.6 MMOL/L — SIGNIFICANT CHANGE UP (ref 3.5–5.3)
RBC # BLD: 5.41 M/UL — SIGNIFICANT CHANGE UP (ref 4.2–5.8)
RBC # FLD: 13.7 % — SIGNIFICANT CHANGE UP (ref 10.3–14.5)
RH IG SCN BLD-IMP: POSITIVE — SIGNIFICANT CHANGE UP
SODIUM SERPL-SCNC: 139 MMOL/L — SIGNIFICANT CHANGE UP (ref 135–145)
WBC # BLD: 6.52 K/UL — SIGNIFICANT CHANGE UP (ref 3.8–10.5)
WBC # FLD AUTO: 6.52 K/UL — SIGNIFICANT CHANGE UP (ref 3.8–10.5)

## 2023-05-15 PROCEDURE — 93005 ELECTROCARDIOGRAM TRACING: CPT

## 2023-05-15 PROCEDURE — 93655 ICAR CATH ABLTJ DSCRT ARRHYT: CPT

## 2023-05-15 PROCEDURE — 86850 RBC ANTIBODY SCREEN: CPT

## 2023-05-15 PROCEDURE — 86900 BLOOD TYPING SEROLOGIC ABO: CPT

## 2023-05-15 PROCEDURE — 93622 COMP EP EVAL L VENTR PAC&REC: CPT

## 2023-05-15 PROCEDURE — 80048 BASIC METABOLIC PNL TOTAL CA: CPT

## 2023-05-15 PROCEDURE — 93623 PRGRMD STIMJ&PACG IV RX NFS: CPT

## 2023-05-15 PROCEDURE — 93656 COMPRE EP EVAL ABLTJ ATR FIB: CPT

## 2023-05-15 PROCEDURE — 86901 BLOOD TYPING SEROLOGIC RH(D): CPT

## 2023-05-15 PROCEDURE — 85027 COMPLETE CBC AUTOMATED: CPT

## 2023-05-15 RX ORDER — ROSUVASTATIN CALCIUM 5 MG/1
1 TABLET ORAL
Qty: 0 | Refills: 0 | DISCHARGE

## 2023-05-15 RX ORDER — LISINOPRIL 2.5 MG/1
5 TABLET ORAL DAILY
Refills: 0 | Status: DISCONTINUED | OUTPATIENT
Start: 2023-05-15 | End: 2023-05-15

## 2023-05-15 RX ORDER — APIXABAN 2.5 MG/1
5 TABLET, FILM COATED ORAL EVERY 12 HOURS
Refills: 0 | Status: DISCONTINUED | OUTPATIENT
Start: 2023-05-15 | End: 2023-05-15

## 2023-05-15 RX ORDER — LISINOPRIL 2.5 MG/1
1 TABLET ORAL
Qty: 30 | Refills: 0
Start: 2023-05-15 | End: 2023-06-13

## 2023-05-15 RX ORDER — METOPROLOL TARTRATE 50 MG
25 TABLET ORAL
Refills: 0 | Status: DISCONTINUED | OUTPATIENT
Start: 2023-05-15 | End: 2023-05-15

## 2023-05-15 RX ORDER — APIXABAN 2.5 MG/1
1 TABLET, FILM COATED ORAL
Qty: 0 | Refills: 0 | DISCHARGE

## 2023-05-15 NOTE — PATIENT PROFILE ADULT - HEALTH LITERACY
Skilled reason for visit: Wound Care          Caregiver involvement: Mother manage medication, cook meals and make MD appointment. PCA assist with ADL;s          Medications reviewed and all medications are available in the home this visit. The following education was provided regarding medications:  Vitamin D is weekly supplement to replace vitamin D levels in the body. MD notified of any discrepancies/look a-like medications/medication interactions: n/a    Medications are effective at this time.            Home health supplies by type and quantity ordered/delivered this visit include: yes         Patient education provided this visit:Reviewed to reported any redness, swelling, warmth, fever, chills, increased heart/respiratory rate, uncontrolled pain/tenderness, drainage:green/yellow/brown, or odor to MD ghazal Ho education provided: n/a         Patient level of understanding of education provided: Patient and CG verbalized full understanding of education provided          Skilled Care Performed this visit: Wound Care          Patient response to procedure performed:  Patient toleated wound care well denied and discomfort         Agency Progress toward goals: Patient wounds very slow healing but progressing          Patient's Progress towards personal goals: Patient progressing well   Home exercise program: Turn and reposition and elevate BLE         Continued need for the following skills: Nursing         Plan for next visit: Wound Care          Patient and/or caregiver notified and agrees to changes in the Plan of Care YES         The following discharge planning was discussed with the pt/caregiver: dc when wounds are  healed, pain controlled and patient independent with medication regimen no

## 2023-05-15 NOTE — H&P CARDIOLOGY - NSICDXPASTMEDICALHX_GEN_ALL_CORE_FT
PAST MEDICAL HISTORY:  Other persistent atrial fibrillation      PAST MEDICAL HISTORY:  History of BPH     HLD (hyperlipidemia)     Other persistent atrial fibrillation

## 2023-05-15 NOTE — PROGRESS NOTE ADULT - SUBJECTIVE AND OBJECTIVE BOX
EP Brief Operative Note    Diagnosis: persistent AF  Procedure: AF ablation (PVI, CTI, normal LA voltage)  Surgeon: Hakeem Evans M.D.  Findings: none  EBL: minimal  Specimens: none  Post-op Diagnosis: NSR  Assistants: none      A/P) s/p Afib ablation (PVI, CTI, normal LA voltage), no acute complications    -restart all home meds including eliquis  -start lisinopril 10mg daily for mild LV dysfunction  -expect d/c home today  -f/u with me at Dr Sosa's office on Tuesday 6/20 at 2pm      Hakeem Evans M.D., [facility]  Cardiac Electrophysiology  [facility]  [address] [place] [address], [place] [address]  [email or web address]    office [phone]  pager [phone]

## 2023-05-15 NOTE — ASU DISCHARGE PLAN (ADULT/PEDIATRIC) - PROVIDER TOKENS
FREE:[LAST:[Nathan],PHONE:[(   )    -],FAX:[(   )    -],ADDRESS:[Dr Sosa's office on Tuesday 6/20 at 2pm],SCHEDULEDAPPT:[06/20/2023],SCHEDULEDAPPTTIME:[02:00 PM]],PROVIDER:[TOKEN:[[id number]],SCHEDULEDAPPT:[06/20/2023],SCHEDULEDAPPTTIME:[02:00 PM]]

## 2023-05-15 NOTE — H&P CARDIOLOGY - HISTORY OF PRESENT ILLNESS
This is a 56 yo male PMH HLD, persistent afib who presents for JO/DCCV.  Pt. endorses SOB/MICHAEL and palpitations.  Denies chest pain/pressure, dizziness, diaphoresis, nausea, vomiting, peripheral edema, recent weight gain, or syncope.     < from: JO w/TTE (w/3D Echo) (Transesophageal Echocardiogram) (11.07.22 @ 11:26) >  Conclusions:  1. Tethered mitral valve leaflets with normal opening. Mild  mitral regurgitation.  2. Normal trileaflet aortic valve. Mild aortic  regurgitation.  3. Mild aortic root dilatation  (Ao: 4.2 cm at the sinuses  of Valsalva), ascending aorta diameter 3.7 cm. Normal size  aortic arch and descending thoracic aorta. Minimal  atheroma.  4. Normal left atrium.  LA volume index = 32 cc/m2. No left  atrial or left atrial appendage thrombus. Normal left  atrial appendage function (maximum velocityabout 50 cm/s).  5. Patient in atrial fibrillation; ejection fraction varies  with R-R interval. Mild global left ventricular systolic  dysfunction.  6. Normal right ventricular size with low normal right  ventricular systolic function. TAPSEabout 1.7 cm.  *** No previous Echo exam.  ------------------------------------------------------------------------  Confirmed on  11/7/2022 - 13:33:04 by Rama Hilton M.D.  ------------------------------------------------------------------------    < end of copied text >   This is a 58 yo male with no known impantable devices noted with COVID 19 negative Vaccinated with  PMHX HLD, persistent Afib dx in 2020 on Eliquis last dose this am, and  BPH, . Pt had JO /DCCV in Jan 2023 now developing mild CM LVEF 45-50%. B.  Pt. endorses SOB/MICHAEL and palpitations.  Denies chest pain/pressure, dizziness, diaphoresis, nausea, vomiting, peripheral edema, recent weight gain, or syncope.   Presents for AFIB Ablation today with Dr. Evans . Currently CP free no sob no dizziness or lightheadedness noted.     < from: JO w/TTE (w/3D Echo) (Transesophageal Echocardiogram) (11.07.22 @ 11:26) >  Conclusions:  1. Tethered mitral valve leaflets with normal opening. Mild  mitral regurgitation.  2. Normal trileaflet aortic valve. Mild aortic  regurgitation.  3. Mild aortic root dilatation  (Ao: 4.2 cm at the sinuses  of Valsalva), ascending aorta diameter 3.7 cm. Normal size  aortic arch and descending thoracic aorta. Minimal  atheroma.  4. Normal left atrium.  LA volume index = 32 cc/m2. No left  atrial or left atrial appendage thrombus. Normal left  atrial appendage function (maximum velocityabout 50 cm/s).  5. Patient in atrial fibrillation; ejection fraction varies  with R-R interval. Mild global left ventricular systolic  dysfunction.  6. Normal right ventricular size with low normal right  ventricular systolic function. TAPSEabout 1.7 cm.  *** No previous Echo exam.  ------------------------------------------------------------------------  Confirmed on  11/7/2022 - 13:33:04 by Rama Hilton M.D.  ------------------------------------------------------------------------    < end of copied text >   This is a 56 yo male with no known implantable devices noted with COVID 19 negative Vaccinated with  PMHX HLD, Persistent Afib dx in 2020 on Eliquis last dose this am, Pre diabetic diet controlled, and  BPH . Pt had JO /DCCV in Jan 2023 lasted in NSR x 2 weeks now in AFIB and developing mild CM LVEF 45-50%. B.  Pt. endorses SOB/MICHAEL and palpitations.  Denies chest pain/pressure, dizziness, diaphoresis, nausea, vomiting, peripheral edema, recent weight gain, or syncope.   Presents for AFIB Ablation today with Dr. Evans . Currently CP free no sob no dizziness or lightheadedness noted.   Pt Cardiologist Dr. Sosa     < from: JO w/TTE (w/3D Echo) (Transesophageal Echocardiogram) (11.07.22 @ 11:26) >  Conclusions:  1. Tethered mitral valve leaflets with normal opening. Mild  mitral regurgitation.  2. Normal trileaflet aortic valve. Mild aortic  regurgitation.  3. Mild aortic root dilatation  (Ao: 4.2 cm at the sinuses  of Valsalva), ascending aorta diameter 3.7 cm. Normal size  aortic arch and descending thoracic aorta. Minimal  atheroma.  4. Normal left atrium.  LA volume index = 32 cc/m2. No left  atrial or left atrial appendage thrombus. Normal left  atrial appendage function (maximum velocityabout 50 cm/s).  5. Patient in atrial fibrillation; ejection fraction varies  with R-R interval. Mild global left ventricular systolic  dysfunction.  6. Normal right ventricular size with low normal right  ventricular systolic function. TAPSEabout 1.7 cm.  *** No previous Echo exam.  ------------------------------------------------------------------------  Confirmed on  11/7/2022 - 13:33:04 by Rama Hilton M.D.  ------------------------------------------------------------------------    < end of copied text >   This is a 56 yo male with no known implantable devices noted with COVID 19 negative Vaccinated with Pfizer x 2 with  PMHX HLD, Persistent Afib dx in 2020 on Eliquis last dose this am, Pre diabetic diet controlled, and  BPH . Pt had JO /DCCV in Jan 2023 lasted in NSR x 2 weeks now in AFIB and developing mild CM LVEF 45-50%. B.  Pt. endorses SOB/MICHAEL and palpitations.  Denies chest pain/pressure, dizziness, diaphoresis, nausea, vomiting, peripheral edema, recent weight gain, or syncope.   Presents for AFIB Ablation today with Dr. Evans . Currently CP free no sob no dizziness or lightheadedness noted.   Pt Cardiologist Dr. Sosa     < from: JO w/TTE (w/3D Echo) (Transesophageal Echocardiogram) (11.07.22 @ 11:26) >  Conclusions:  1. Tethered mitral valve leaflets with normal opening. Mild  mitral regurgitation.  2. Normal trileaflet aortic valve. Mild aortic  regurgitation.  3. Mild aortic root dilatation  (Ao: 4.2 cm at the sinuses  of Valsalva), ascending aorta diameter 3.7 cm. Normal size  aortic arch and descending thoracic aorta. Minimal  atheroma.  4. Normal left atrium.  LA volume index = 32 cc/m2. No left  atrial or left atrial appendage thrombus. Normal left  atrial appendage function (maximum velocityabout 50 cm/s).  5. Patient in atrial fibrillation; ejection fraction varies  with R-R interval. Mild global left ventricular systolic  dysfunction.  6. Normal right ventricular size with low normal right  ventricular systolic function. TAPSEabout 1.7 cm.  *** No previous Echo exam.  ------------------------------------------------------------------------  Confirmed on  11/7/2022 - 13:33:04 by Rama Hilton M.D.  ------------------------------------------------------------------------    < end of copied text >

## 2023-05-15 NOTE — ASU DISCHARGE PLAN (ADULT/PEDIATRIC) - ASU DC SPECIAL INSTRUCTIONSFT
WOUND CARE:  The day AFTER your procedure  - Remove the bandage at the site GENTLY, clean with mild soap and water, and pat dry; leave open to air  - You may shower   - DO NOT apply lotions, creams, ointments, powder, perfumes to your incision site  - Check your groin every day. A small amount of bruising or soreness is normal, a bump (smaller than nickel) might be present which is normal  - DO NOT SOAK your site for 1 week (no baths, no pools, no tubs, etc..)    ACTIVITY:  Your procedure was done through your groin  For the next 7 DAYS:  - Limit climbing stairs, no strenuous activity, pushing , pulling, or straining (especially during bowel movements)  - DO NOT LIFT anything 10 lbs or heavier   - You may resume sexual activity in 7 days, unless instructed otherwise    Mild palpitations are normal     Follow heart healthy diet recommended by your doctor, , if you smoke STOP SMOKING (may call [phone] for center of tobacco control if you need assistance).  For the next 24 hours:   - Stay at home and rest, do not drive or operate heavy machinery   Do not drink alcoholic beverages   Do not make important personal or business decisions     ***CALL YOUR DOCTOR ***  IF you have fever, chills, body aches, or severe pain, swelling, redness, heat, yellow drainage from your incision site  IF bleeding or significant new swelling from your puncture site  IF you experience rapid heartbeat or palpitations that cause: lightheadedness, dizziness, or fainting spell.  IF you experience difficulty swallowing, or pain with swallowing   IF unable to get in contact with your doctor, you may call the Cardiology Office at [facility] at [phone]

## 2023-05-15 NOTE — PATIENT PROFILE ADULT - FALL HARM RISK - HARM RISK INTERVENTIONS

## 2023-05-15 NOTE — ASU DISCHARGE PLAN (ADULT/PEDIATRIC) - CARE PROVIDER_API CALL
Dr Sheila Evans's office on Tuesday 6/20 at 2pm  Phone: (   )    -  Fax: (   )    -  Scheduled Appointment: 06/20/2023 02:00 PM    Hakeem Evans)  Cardiac Electrophysiology; Cardiovascular Disease; Nuclear Cardiology  [address] [facility] [address] [place] [address]  Phone: [phone]  Fax: [phone]  Scheduled Appointment: 06/20/2023 02:00 PM

## 2023-05-15 NOTE — ASU DISCHARGE PLAN (ADULT/PEDIATRIC) - ASU DC REMOVE DRESSINGFT
Med rec complete per pt at bedside  Allergies reviewed - NKDA  Pt finished a 10 day course of Keflex around 1/28/18  Pt was also started on Clindamycin and Bactrim for 10 days starting 1/26/18   24

## 2023-05-15 NOTE — ASU DISCHARGE PLAN (ADULT/PEDIATRIC) - NS MD DC FALL RISK RISK
For information on Fall & Injury Prevention, visit: [email or web address] OR  [email or web address] OR  https://www.cdc.gov/steadi/patient.html

## 2023-05-16 RX ORDER — TAMSULOSIN HYDROCHLORIDE 0.4 MG/1
1 CAPSULE ORAL
Refills: 0 | DISCHARGE

## 2023-05-16 RX ORDER — METOPROLOL TARTRATE 50 MG
1 TABLET ORAL
Refills: 0 | DISCHARGE
